# Patient Record
Sex: MALE | Race: WHITE | URBAN - NONMETROPOLITAN AREA
[De-identification: names, ages, dates, MRNs, and addresses within clinical notes are randomized per-mention and may not be internally consistent; named-entity substitution may affect disease eponyms.]

---

## 2020-03-30 ENCOUNTER — TELEPHONE (OUTPATIENT)
Dept: FAMILY MEDICINE CLINIC | Age: 30
End: 2020-03-30

## 2020-03-30 RX ORDER — PREDNISONE 10 MG/1
TABLET ORAL
Qty: 18 TABLET | Refills: 0 | Status: SHIPPED | OUTPATIENT
Start: 2020-03-30 | End: 2020-04-08

## 2020-03-30 NOTE — TELEPHONE ENCOUNTER
Ellen Christiansen has poison ivy breaking out on his face and upper body. These are fresh and not scabbed over yet. He is asking if you can give him something? Please send to [facility] in [place]. Drakexed

## 2021-06-14 ENCOUNTER — OFFICE VISIT (OUTPATIENT)
Dept: FAMILY MEDICINE CLINIC | Age: 31
End: 2021-06-14
Payer: COMMERCIAL

## 2021-06-14 VITALS
BODY MASS INDEX: 33.47 KG/M2 | DIASTOLIC BLOOD PRESSURE: 100 MMHG | WEIGHT: 233.8 LBS | SYSTOLIC BLOOD PRESSURE: 152 MMHG | HEART RATE: 91 BPM | HEIGHT: 70 IN | OXYGEN SATURATION: 97 % | TEMPERATURE: 98 F

## 2021-06-14 DIAGNOSIS — L23.7 POISON IVY: Primary | ICD-10-CM

## 2021-06-14 DIAGNOSIS — I10 HYPERTENSION, UNSPECIFIED TYPE: ICD-10-CM

## 2021-06-14 PROCEDURE — 96372 THER/PROPH/DIAG INJ SC/IM: CPT | Performed by: FAMILY MEDICINE

## 2021-06-14 PROCEDURE — 99213 OFFICE O/P EST LOW 20 MIN: CPT | Performed by: FAMILY MEDICINE

## 2021-06-14 RX ORDER — METHYLPREDNISOLONE ACETATE 40 MG/ML
40 INJECTION, SUSPENSION INTRA-ARTICULAR; INTRALESIONAL; INTRAMUSCULAR; SOFT TISSUE ONCE
Status: COMPLETED | OUTPATIENT
Start: 2021-06-14 | End: 2021-06-14

## 2021-06-14 RX ADMIN — METHYLPREDNISOLONE ACETATE 40 MG: 40 INJECTION, SUSPENSION INTRA-ARTICULAR; INTRALESIONAL; INTRAMUSCULAR; SOFT TISSUE at 13:25

## 2021-06-14 NOTE — PROGRESS NOTES
OFFICE NOTE    6/14/21  Name: Heather Page  KBR:[id number]   Sex:male   Age:30 y.o. SUBJECTIVE  Chief Complaint   Patient presents with   [facility]     onset 3 days       HPI comes in with c/o poison ivy. Works at [employer], took down dead tree late last week. Sees Dr. Cely Galarza not very regular. Review of Systems no chest pain, FONSECA, palpitations. Sleep apnea      No current outpatient medications on file. Allergies   Allergen Reactions    Penicillins        No past medical history on file. No past surgical history on file. No family history on file. Social History     Tobacco History     Smoking Status  Never Smoker    Smokeless Tobacco Use  Never Used          Alcohol History     Alcohol Use Status  Infrequent use, socially          Drug Use     Drug Use Status  Not Asked          Sexual Activity     Sexually Active  Not Asked                OBJECTIVE  Vitals:    06/14/21 1304 06/14/21 1315   BP: (!) 148/98 (!) 152/100   Site: Left Upper Arm    Position: Sitting    Pulse: 91    Temp: 98 °F (36.7 °C)    TempSrc: Temporal    SpO2: 97%    Weight: 233 lb 12.8 oz (106.1 kg)    Height: 5' 10\" (1.778 m)         Body mass index is 33.55 kg/m². No orders of the defined types were placed in this encounter. EXAM   Physical Exam  Vitals and nursing note reviewed. Constitutional:       Appearance: Normal appearance. He is obese. Comments: But mesomorphic   Cardiovascular:      Rate and Rhythm: Normal rate and regular rhythm. Heart sounds: No murmur heard. Pulmonary:      Effort: Pulmonary effort is normal.      Breath sounds: Normal breath sounds. Skin:     Coloration: Skin is not jaundiced. Findings: Rash present. No bruising. Comments: morbillaform rash with grouped vesicles. Forearms and lower abdomen   Neurological:      Mental Status: He is alert. Jamarcus Leyva was seen today for poison ivy.     Diagnoses and all orders for this visit:    Poison ivy   significant case, will give cortisone shot  Hypertension, unspecified type  Believe this is probably real. Promised he would make apt with his PMD and get this resolved  Other orders  -     methylPREDNISolone acetate (DEPO-MEDROL) injection 40 mg          Return needs apt with Dr. Duane Gutierrez within 1 mos for elevated BP.     Electronically signed by Corry Robertson MD on 6/14/21 at 1:10 PM EDT

## 2021-06-16 ENCOUNTER — TELEPHONE (OUTPATIENT)
Dept: PRIMARY CARE CLINIC | Age: 31
End: 2021-06-16

## 2021-06-16 NOTE — TELEPHONE ENCOUNTER
Called patient to follow up on previous walk in visit - Patient states they are feeling better and will call to follow up or come back in if needed.
no